# Patient Record
Sex: MALE | Race: ASIAN | Employment: OTHER | URBAN - METROPOLITAN AREA
[De-identification: names, ages, dates, MRNs, and addresses within clinical notes are randomized per-mention and may not be internally consistent; named-entity substitution may affect disease eponyms.]

---

## 2017-04-26 ENCOUNTER — OFFICE VISIT (OUTPATIENT)
Dept: FAMILY MEDICINE CLINIC | Facility: CLINIC | Age: 42
End: 2017-04-26

## 2017-04-26 VITALS
DIASTOLIC BLOOD PRESSURE: 72 MMHG | WEIGHT: 234 LBS | TEMPERATURE: 98 F | OXYGEN SATURATION: 97 % | HEART RATE: 72 BPM | HEIGHT: 74 IN | BODY MASS INDEX: 30.03 KG/M2 | RESPIRATION RATE: 18 BRPM | SYSTOLIC BLOOD PRESSURE: 118 MMHG

## 2017-04-26 DIAGNOSIS — Z00.00 ANNUAL PHYSICAL EXAM: Primary | ICD-10-CM

## 2017-04-26 PROCEDURE — 99386 PREV VISIT NEW AGE 40-64: CPT | Performed by: FAMILY MEDICINE

## 2017-04-26 NOTE — PROGRESS NOTES
/72 mmHg  Pulse 72  Temp(Src) 97.8 °F (36.6 °C) (Oral)  Resp 18  Ht 74\"  Wt 234 lb  BMI 30.03 kg/m2  SpO2 97% Body mass index is 30.03 kg/(m^2). Patient presents with:   Well Adult      Brady Esparza is a 37year old male who presents for a or cough  CARDIOVASCULAR: denies chest pain or JONES; no palpitations  GI: denies nausea, vomiting, constipation, diarrhea; no rectal bleeding; no heartburn  :  (Male):denies nocturia or changes in stream  MUSCULOSKELETAL: no joint complaints upper or lo advised to lose weight,   Exercise regularly --- Dietary measures discussed include diet about 1800 calories - Excercise regimen also reviewed as well as long term benefits on overall health.    Recommend at least 30 minutes a day 3-4 times a week of aerobi

## 2017-04-29 NOTE — PROGRESS NOTES
Quick Note:    Abnormal lipid profile, patient needs to be on medication  Follow-up to discuss options    ______

## 2017-05-01 ENCOUNTER — OFFICE VISIT (OUTPATIENT)
Dept: FAMILY MEDICINE CLINIC | Facility: CLINIC | Age: 42
End: 2017-05-01

## 2017-05-01 VITALS
SYSTOLIC BLOOD PRESSURE: 144 MMHG | DIASTOLIC BLOOD PRESSURE: 92 MMHG | OXYGEN SATURATION: 96 % | BODY MASS INDEX: 30 KG/M2 | WEIGHT: 236 LBS | TEMPERATURE: 98 F | HEART RATE: 71 BPM

## 2017-05-01 DIAGNOSIS — R73.01 IMPAIRED FASTING GLUCOSE: ICD-10-CM

## 2017-05-01 DIAGNOSIS — E78.5 HYPERLIPIDEMIA, UNSPECIFIED HYPERLIPIDEMIA TYPE: Primary | ICD-10-CM

## 2017-05-01 PROCEDURE — 99214 OFFICE O/P EST MOD 30 MIN: CPT | Performed by: FAMILY MEDICINE

## 2017-05-01 RX ORDER — ROSUVASTATIN CALCIUM 10 MG/1
10 TABLET, COATED ORAL NIGHTLY
Qty: 30 TABLET | Refills: 3 | Status: SHIPPED | OUTPATIENT
Start: 2017-05-01 | End: 2018-04-25

## 2017-05-02 NOTE — PROGRESS NOTES
/92 mmHg  Pulse 71  Temp(Src) 98.1 °F (36.7 °C) (Oral)  Wt 236 lb  SpO2 96%              Patient presents with:  Lab Results: room 1--       HPI;    Byron Ragland is a 37year old male. , he was seen here last week, he is here for follow-up after t normocephalic,ears and throat are clear  NECK: supple,no adenopathy,no bruits  LUNGS: clear to auscultation  CARDIO: RRR without murmur  GI: good BS's,no masses, HSM or tenderness  EXTREMITIES: no cyanosis, clubbing or edema    ASSESSMENT AND PLAN:   Diagn

## 2018-02-17 ENCOUNTER — HOSPITAL ENCOUNTER (OUTPATIENT)
Age: 43
Discharge: HOME | End: 2018-02-17
Attending: FAMILY MEDICINE
Payer: COMMERCIAL

## 2018-02-17 VITALS
TEMPERATURE: 98 F | RESPIRATION RATE: 16 BRPM | BODY MASS INDEX: 29.39 KG/M2 | HEIGHT: 74 IN | HEART RATE: 69 BPM | DIASTOLIC BLOOD PRESSURE: 84 MMHG | WEIGHT: 229 LBS | SYSTOLIC BLOOD PRESSURE: 128 MMHG | OXYGEN SATURATION: 97 %

## 2018-02-17 DIAGNOSIS — K12.2 UVULITIS: Primary | ICD-10-CM

## 2018-02-17 LAB — POCT RAPID STREP: NEGATIVE

## 2018-02-17 PROCEDURE — 99204 OFFICE O/P NEW MOD 45 MIN: CPT

## 2018-02-17 PROCEDURE — 87081 CULTURE SCREEN ONLY: CPT | Performed by: FAMILY MEDICINE

## 2018-02-17 PROCEDURE — 99214 OFFICE O/P EST MOD 30 MIN: CPT

## 2018-02-17 PROCEDURE — 87430 STREP A AG IA: CPT | Performed by: FAMILY MEDICINE

## 2018-02-17 RX ORDER — METHYLPREDNISOLONE 4 MG/1
TABLET ORAL
Qty: 1 PACKAGE | Refills: 0 | Status: SHIPPED | OUTPATIENT
Start: 2018-02-17 | End: 2018-04-25 | Stop reason: ALTCHOICE

## 2018-02-17 RX ORDER — AMOXICILLIN AND CLAVULANATE POTASSIUM 875; 125 MG/1; MG/1
1 TABLET, FILM COATED ORAL 2 TIMES DAILY
Qty: 20 TABLET | Refills: 0 | Status: SHIPPED | OUTPATIENT
Start: 2018-02-17 | End: 2018-02-27

## 2018-02-17 NOTE — ED PROVIDER NOTES
Patient Seen in: [facility]    History   Patient presents with:  Sore Throat    Stated Complaint: Crow EVANGELISTA  45-year-old male coming in with complains of swollen uvula that he woke up this morning with, associate obstruction, bilateral tympanic membranes within normal limits, nares normal  NECK: FROM, supple, anterior cervical lymphadenopathy noted bilaterally  LUNGS: CTAB, no RRW  CV: RRR  ABD: not distended  NEURO: Alert and oriented to person place and time  [facility] Normal, Disp-1 Package, [employer]

## 2018-02-17 NOTE — ED INITIAL ASSESSMENT (HPI)
The patient is here for evaluation of a swollen uvula and throat discomfort. Denies any SOB, BAKARI, or dysphagia. States he woke up with the swollen uvula this morning and hasn't taken anything for the discomfort.   Denies any fevers, chills, or other sympt

## 2018-04-12 ENCOUNTER — LAB SERVICES (OUTPATIENT)
Dept: OTHER | Age: 43
End: 2018-04-12

## 2018-04-12 ENCOUNTER — CHARTING TRANS (OUTPATIENT)
Dept: OTHER | Age: 43
End: 2018-04-12

## 2018-04-12 LAB — RAPID STREP GROUP A: NORMAL

## 2018-04-25 ENCOUNTER — OFFICE VISIT (OUTPATIENT)
Dept: FAMILY MEDICINE CLINIC | Facility: CLINIC | Age: 43
End: 2018-04-25

## 2018-04-25 VITALS
HEIGHT: 74 IN | HEART RATE: 67 BPM | DIASTOLIC BLOOD PRESSURE: 84 MMHG | RESPIRATION RATE: 18 BRPM | BODY MASS INDEX: 29.9 KG/M2 | OXYGEN SATURATION: 98 % | SYSTOLIC BLOOD PRESSURE: 120 MMHG | TEMPERATURE: 98 F | WEIGHT: 233 LBS

## 2018-04-25 DIAGNOSIS — R03.0 ELEVATED BLOOD PRESSURE READING WITHOUT DIAGNOSIS OF HYPERTENSION: ICD-10-CM

## 2018-04-25 DIAGNOSIS — Z00.00 ROUTINE GENERAL MEDICAL EXAMINATION AT A HEALTH CARE FACILITY: Primary | ICD-10-CM

## 2018-04-25 DIAGNOSIS — E78.5 HYPERLIPIDEMIA, UNSPECIFIED HYPERLIPIDEMIA TYPE: ICD-10-CM

## 2018-04-25 DIAGNOSIS — R73.01 IMPAIRED FASTING GLUCOSE: ICD-10-CM

## 2018-04-25 PROCEDURE — 85027 COMPLETE CBC AUTOMATED: CPT | Performed by: FAMILY MEDICINE

## 2018-04-25 PROCEDURE — 80053 COMPREHEN METABOLIC PANEL: CPT | Performed by: FAMILY MEDICINE

## 2018-04-25 PROCEDURE — 80061 LIPID PANEL: CPT | Performed by: FAMILY MEDICINE

## 2018-04-25 PROCEDURE — 83036 HEMOGLOBIN GLYCOSYLATED A1C: CPT | Performed by: FAMILY MEDICINE

## 2018-04-25 PROCEDURE — 99213 OFFICE O/P EST LOW 20 MIN: CPT | Performed by: FAMILY MEDICINE

## 2018-04-25 PROCEDURE — 84443 ASSAY THYROID STIM HORMONE: CPT | Performed by: FAMILY MEDICINE

## 2018-04-25 PROCEDURE — 99396 PREV VISIT EST AGE 40-64: CPT | Performed by: FAMILY MEDICINE

## 2018-04-25 PROCEDURE — 84439 ASSAY OF FREE THYROXINE: CPT | Performed by: FAMILY MEDICINE

## 2018-04-25 RX ORDER — ROSUVASTATIN CALCIUM 10 MG/1
10 TABLET, COATED ORAL NIGHTLY
Qty: 30 TABLET | Refills: 3 | Status: SHIPPED | OUTPATIENT
Start: 2018-04-25 | End: 2019-02-18

## 2018-04-25 NOTE — PROGRESS NOTES
/84   Pulse 67   Temp 98.1 °F (36.7 °C) (Oral)   Resp 18   Ht 74\"   Wt 233 lb   SpO2 98%   BMI 29.92 kg/m²  Body mass index is 29.92 kg/m². Patient presents with:   Well Adult      Helen Dill is a 40year old male who presents for a compl Topics    Smoking status: Never Smoker                                                                Smokeless tobacco: Never Used                        Alcohol use: No              Drug use:  No               Exercise: Regularly diet: doesn't watch     R and all orders for this visit:    Routine general medical examination at a health care facility  -     CBC, PLATELET; NO DIFFERENTIAL;  Future  -     COMP METABOLIC PANEL (14)  -     HEMOGLOBIN A1C  -     LIPID PANEL  -     TSH+FREE T4; Future  -     PSA SC plan.  . No Follow-up on file.         Erick Muñoz MD, 4/25/2018, 9:45 AM

## 2018-04-30 ENCOUNTER — TELEPHONE (OUTPATIENT)
Dept: FAMILY MEDICINE CLINIC | Facility: CLINIC | Age: 43
End: 2018-04-30

## 2018-07-30 ENCOUNTER — TELEPHONE (OUTPATIENT)
Dept: FAMILY MEDICINE CLINIC | Facility: CLINIC | Age: 43
End: 2018-07-30

## 2018-07-30 PROBLEM — R79.89 DECREASED TESTOSTERONE LEVEL: Status: ACTIVE | Noted: 2018-07-30

## 2018-09-04 ENCOUNTER — TELEPHONE (OUTPATIENT)
Dept: FAMILY MEDICINE CLINIC | Facility: CLINIC | Age: 43
End: 2018-09-04

## 2018-09-04 NOTE — TELEPHONE ENCOUNTER
[facility] paperwork is complete just needs Dr. Bernadette Bethea. Then the paper work needs to be faxed to [phone].      Kristal Joseph, 09/04/18, 10:51 AM

## 2018-10-31 NOTE — PROGRESS NOTES
/70   Pulse 65   Temp 98.1 °F (36.7 °C) (Oral)   Resp 18   Ht 74\"   Wt 230 lb   SpO2 98%   BMI 29.53 kg/m²               Patient presents with:  ADD       HPI;    Gwendolyn Fuentes is a 44year old male, who is otherwise healthy  He is taking his med heartburn  NEURO: denies headaches  EXAM:   /70   Pulse 65   Temp 98.1 °F (36.7 °C) (Oral)   Resp 18   Ht 74\"   Wt 230 lb   SpO2 98%   BMI 29.53 kg/m²   GENERAL: well developed, well nourished,in no apparent distress  SKIN: no rashes,no suspicious l TSH and Free T4            Sandra Matthew [address] [place] [address]., [address] [place] [address]    Electronically signed        Influenza Vaccine(1) due on 09/01/2019  Annual Depression Screen due on 04/25/2019  Annual Physical due on 04/

## 2018-11-01 VITALS
DIASTOLIC BLOOD PRESSURE: 72 MMHG | SYSTOLIC BLOOD PRESSURE: 116 MMHG | RESPIRATION RATE: 16 BRPM | TEMPERATURE: 97.9 F | HEART RATE: 68 BPM

## 2019-02-19 RX ORDER — ROSUVASTATIN CALCIUM 10 MG/1
10 TABLET, COATED ORAL NIGHTLY
Qty: 30 TABLET | Refills: 3 | Status: SHIPPED | OUTPATIENT
Start: 2019-02-19 | End: 2020-02-26

## 2019-02-19 NOTE — TELEPHONE ENCOUNTER
Rosuvastatin Calcium 10 MG Oral Tab          Sig: Take 1 tablet (10 mg total) by mouth nightly.     Disp:  30 tablet    Refills:  3    Start: 2/18/2019    Class: Normal    Non-formulary    Last ordered: 10 months ago by Alyse Ness MD     Cholesterol Medic

## 2020-02-26 ENCOUNTER — OFFICE VISIT (OUTPATIENT)
Dept: FAMILY MEDICINE CLINIC | Facility: CLINIC | Age: 45
End: 2020-02-26
Payer: COMMERCIAL

## 2020-02-26 VITALS
HEART RATE: 66 BPM | TEMPERATURE: 99 F | OXYGEN SATURATION: 97 % | DIASTOLIC BLOOD PRESSURE: 68 MMHG | RESPIRATION RATE: 16 BRPM | HEIGHT: 74 IN | SYSTOLIC BLOOD PRESSURE: 106 MMHG | BODY MASS INDEX: 29.73 KG/M2 | WEIGHT: 231.63 LBS

## 2020-02-26 DIAGNOSIS — Z00.00 ENCOUNTER FOR ANNUAL PHYSICAL EXAM: Primary | ICD-10-CM

## 2020-02-26 DIAGNOSIS — E78.5 HYPERLIPIDEMIA, UNSPECIFIED HYPERLIPIDEMIA TYPE: ICD-10-CM

## 2020-02-26 PROCEDURE — 99396 PREV VISIT EST AGE 40-64: CPT | Performed by: FAMILY MEDICINE

## 2020-02-26 RX ORDER — ROSUVASTATIN CALCIUM 10 MG/1
10 TABLET, COATED ORAL NIGHTLY
Qty: 30 TABLET | Refills: 3 | Status: SHIPPED | OUTPATIENT
Start: 2020-02-26 | End: 2020-12-31

## 2020-02-26 NOTE — PROGRESS NOTES
/68   Pulse 66   Temp 98.6 °F (37 °C) (Temporal)   Resp 16   Ht 74\"   Wt 231 lb 9.6 oz (105.1 kg)   SpO2 97%   BMI 29.74 kg/m²  Body mass index is 29.74 kg/m².      Patient presents with:  Physical  Complete Form: for [facility]   Lab: needed Never Smoker      Smokeless tobacco: Never Used    Substance and Sexual Activity      Alcohol use: No      Drug use: No     Exercise: none.   Diet: doesn't watch     REVIEW OF SYSTEMS:   GENERAL HEALTH: feels well otherwise, denies fever  SKIN: denies any u annual physical exam  -Fasting blood work ordered  Flu shot refused  -     CBC WITH DIFFERENTIAL WITH PLATELET  -     COMP METABOLIC PANEL (14)  -     LIPID PANEL  -     aspirin 81 MG Oral Tab;  Take 1 tablet (81 mg total) by mouth daily.  -     TSH [facility]

## 2020-02-27 LAB
ABSOLUTE BASOPHILS: 41 CELLS/UL (ref 0–200)
ABSOLUTE EOSINOPHILS: 102 CELLS/UL (ref 15–500)
ABSOLUTE LYMPHOCYTES: 2086 CELLS/UL (ref 850–3900)
ABSOLUTE MONOCYTES: 342 CELLS/UL (ref 200–950)
ABSOLUTE NEUTROPHILS: 2530 CELLS/UL (ref 1500–7800)
ALBUMIN/GLOBULIN RATIO: 1.7 (CALC) (ref 1–2.5)
ALBUMIN: 4.7 G/DL (ref 3.6–5.1)
ALKALINE PHOSPHATASE: 71 U/L (ref 36–130)
ALT: 24 U/L (ref 9–46)
AST: 16 U/L (ref 10–40)
BASOPHILS: 0.8 %
BILIRUBIN, TOTAL: 0.6 MG/DL (ref 0.2–1.2)
BUN: 13 MG/DL (ref 7–25)
CALCIUM: 9.6 MG/DL (ref 8.6–10.3)
CARBON DIOXIDE: 29 MMOL/L (ref 20–32)
CHLORIDE: 105 MMOL/L (ref 98–110)
CHOL/HDLC RATIO: 5.8 (CALC)
CHOLESTEROL, TOTAL: 204 MG/DL
CREATININE: 0.9 MG/DL (ref 0.6–1.35)
EGFR IF AFRICN AM: 121 ML/MIN/1.73M2
EGFR IF NONAFRICN AM: 104 ML/MIN/1.73M2
EOSINOPHILS: 2 %
GLOBULIN: 2.8 G/DL (CALC) (ref 1.9–3.7)
GLUCOSE: 101 MG/DL (ref 65–99)
HDL CHOLESTEROL: 35 MG/DL
HEMATOCRIT: 44.5 % (ref 38.5–50)
HEMOGLOBIN A1C: 6.1 % OF TOTAL HGB
HEMOGLOBIN: 15.4 G/DL (ref 13.2–17.1)
LDL-CHOLESTEROL: 124 MG/DL (CALC)
LYMPHOCYTES: 40.9 %
MCH: 30.3 PG (ref 27–33)
MCHC: 34.6 G/DL (ref 32–36)
MCV: 87.6 FL (ref 80–100)
MONOCYTES: 6.7 %
MPV: 10.1 FL (ref 7.5–12.5)
NEUTROPHILS: 49.6 %
NON-HDL CHOLESTEROL: 169 MG/DL (CALC)
PLATELET COUNT: 252 THOUSAND/UL (ref 140–400)
POTASSIUM: 4.6 MMOL/L (ref 3.5–5.3)
PROTEIN, TOTAL: 7.5 G/DL (ref 6.1–8.1)
RDW: 12.3 % (ref 11–15)
RED BLOOD CELL COUNT: 5.08 MILLION/UL (ref 4.2–5.8)
SODIUM: 141 MMOL/L (ref 135–146)
TRIGLYCERIDES: 317 MG/DL
TSH W/REFLEX TO FT4: 2.4 MIU/L (ref 0.4–4.5)
WHITE BLOOD CELL COUNT: 5.1 THOUSAND/UL (ref 3.8–10.8)

## 2020-02-27 NOTE — PROGRESS NOTES
Paper work for [facility] is at the . [address] Dr. Marlaine Olszewski, 02/26/20, 11:12 AM

## 2020-02-27 NOTE — PROGRESS NOTES
Elevated cholesterol and LDL as well as triglycerides, need to take the medication Crestor regularly  Recheck lipid profile and CMP in 3 months  Your sugars were also borderline high  Your hemoglobin A1c has increased to 6.1  Need to exercise and lose weig

## 2020-02-28 DIAGNOSIS — R73.09 ABNORMAL GLUCOSE: ICD-10-CM

## 2020-02-28 DIAGNOSIS — E78.5 HYPERLIPIDEMIA, UNSPECIFIED HYPERLIPIDEMIA TYPE: Primary | ICD-10-CM

## 2020-03-04 ENCOUNTER — TELEPHONE (OUTPATIENT)
Dept: FAMILY MEDICINE CLINIC | Facility: CLINIC | Age: 45
End: 2020-03-04

## 2020-05-04 DIAGNOSIS — E78.5 HYPERLIPIDEMIA, UNSPECIFIED HYPERLIPIDEMIA TYPE: ICD-10-CM

## 2020-05-04 DIAGNOSIS — Z00.00 ENCOUNTER FOR ANNUAL PHYSICAL EXAM: ICD-10-CM

## 2020-05-05 RX ORDER — ROSUVASTATIN CALCIUM 10 MG/1
10 TABLET, COATED ORAL NIGHTLY
Qty: 30 TABLET | Refills: 3 | OUTPATIENT
Start: 2020-05-05

## 2020-05-05 NOTE — TELEPHONE ENCOUNTER
LOV 2/26/2020    LAST LAB 2-26-20    LAST RX 2-26-20 30*3    Next OV No future appointments. PROTOCOL    Rosuvastatin Calcium 10 MG Oral Tab               Sig: Take 1 tablet (10 mg total) by mouth nightly.     Disp:  30 tablet    Refills:  3    Start: 5/

## 2020-12-31 DIAGNOSIS — E78.5 HYPERLIPIDEMIA, UNSPECIFIED HYPERLIPIDEMIA TYPE: ICD-10-CM

## 2020-12-31 DIAGNOSIS — Z00.00 ENCOUNTER FOR ANNUAL PHYSICAL EXAM: ICD-10-CM

## 2020-12-31 RX ORDER — ROSUVASTATIN CALCIUM 10 MG/1
10 TABLET, COATED ORAL NIGHTLY
Qty: 90 TABLET | Refills: 1 | Status: SHIPPED | OUTPATIENT
Start: 2020-12-31 | End: 2021-07-01

## 2020-12-31 NOTE — TELEPHONE ENCOUNTER
LOV 2/26/2020    LAST LAB 2/26/2020    LAST RX   Rosuvastatin Calcium 10 MG Oral Tab 30 tablet 3 2/26/2020         Next OV No future appointments. PROTOCOL passed.

## 2020-12-31 NOTE — TELEPHONE ENCOUNTER
LOV 2/26/2020    LAST LAB    LAST RX   aspirin 81 MG Oral Tab 30 tablet 12 5/5/2020         Next OV No future appointments.       PROTOCOL n/a

## 2021-03-09 DIAGNOSIS — Z00.00 ENCOUNTER FOR ANNUAL PHYSICAL EXAM: ICD-10-CM

## 2021-03-09 DIAGNOSIS — E78.5 HYPERLIPIDEMIA, UNSPECIFIED HYPERLIPIDEMIA TYPE: ICD-10-CM

## 2021-03-09 RX ORDER — ROSUVASTATIN CALCIUM 10 MG/1
10 TABLET, COATED ORAL NIGHTLY
Qty: 90 TABLET | Refills: 1 | Status: CANCELLED | OUTPATIENT
Start: 2021-03-09

## 2021-03-09 RX ORDER — ROSUVASTATIN CALCIUM 10 MG/1
10 TABLET, COATED ORAL NIGHTLY
Qty: 90 TABLET | Refills: 1 | OUTPATIENT
Start: 2021-03-09

## 2021-03-09 NOTE — TELEPHONE ENCOUNTER
LOV 2/26/2020    LAST LAB    LAST RX    Next OV No future appointments.       PROTOCOL failed  Rosuvastatin Calcium 10 MG Oral Tab 90 tablet 1 12/31/2020     DENIED AS DUPLICATE, INSTRUCTIONS TO PHARMACY TO CHECK FOR NEW/ REFILLS

## 2021-03-09 NOTE — TELEPHONE ENCOUNTER
Cholesterol Medication Protocol [id number]/09/2021 04:22 PM   ALT < 80 Protocol Details    ALT resulted within past year     Lipid panel within past 12 months     Appointment within past 12 or next 3 months      Please schedule annual physical . Thank you .

## 2021-03-10 NOTE — TELEPHONE ENCOUNTER
aspirin 81 MG Oral Tab 30 tablet 12 12/31/2020     DENIED AS DUPLICATE, INSTRUCTIONS TO PHARMACY TO CHECK FOR NEW/ REFILLS

## 2021-06-28 ENCOUNTER — MED REC SCAN ONLY (OUTPATIENT)
Dept: FAMILY MEDICINE CLINIC | Facility: CLINIC | Age: 46
End: 2021-06-28

## 2021-06-28 ENCOUNTER — TELEPHONE (OUTPATIENT)
Dept: FAMILY MEDICINE CLINIC | Facility: CLINIC | Age: 46
End: 2021-06-28

## 2021-06-28 ENCOUNTER — OFFICE VISIT (OUTPATIENT)
Dept: FAMILY MEDICINE CLINIC | Facility: CLINIC | Age: 46
End: 2021-06-28
Payer: COMMERCIAL

## 2021-06-28 ENCOUNTER — LAB ENCOUNTER (OUTPATIENT)
Dept: LAB | Age: 46
End: 2021-06-28
Attending: FAMILY MEDICINE
Payer: COMMERCIAL

## 2021-06-28 VITALS
TEMPERATURE: 97 F | HEART RATE: 80 BPM | SYSTOLIC BLOOD PRESSURE: 126 MMHG | RESPIRATION RATE: 18 BRPM | DIASTOLIC BLOOD PRESSURE: 80 MMHG | BODY MASS INDEX: 31.7 KG/M2 | WEIGHT: 247 LBS | HEIGHT: 74 IN | OXYGEN SATURATION: 97 %

## 2021-06-28 DIAGNOSIS — S46.312A TRICEPS TENDON RUPTURE, LEFT, INITIAL ENCOUNTER: ICD-10-CM

## 2021-06-28 DIAGNOSIS — Z01.818 PREOP EXAMINATION: Primary | ICD-10-CM

## 2021-06-28 PROCEDURE — 85025 COMPLETE CBC W/AUTO DIFF WBC: CPT | Performed by: FAMILY MEDICINE

## 2021-06-28 PROCEDURE — 3008F BODY MASS INDEX DOCD: CPT | Performed by: FAMILY MEDICINE

## 2021-06-28 PROCEDURE — 99214 OFFICE O/P EST MOD 30 MIN: CPT | Performed by: FAMILY MEDICINE

## 2021-06-28 PROCEDURE — 36415 COLL VENOUS BLD VENIPUNCTURE: CPT | Performed by: FAMILY MEDICINE

## 2021-06-28 PROCEDURE — 3074F SYST BP LT 130 MM HG: CPT | Performed by: FAMILY MEDICINE

## 2021-06-28 PROCEDURE — 3079F DIAST BP 80-89 MM HG: CPT | Performed by: FAMILY MEDICINE

## 2021-06-28 PROCEDURE — 93000 ELECTROCARDIOGRAM COMPLETE: CPT | Performed by: FAMILY MEDICINE

## 2021-06-28 PROCEDURE — 80053 COMPREHEN METABOLIC PANEL: CPT | Performed by: FAMILY MEDICINE

## 2021-06-28 NOTE — TELEPHONE ENCOUNTER
[facility] surgery scheduling called again.     Please fax signed medical clearance to :    [phone]

## 2021-06-28 NOTE — PROGRESS NOTES
Melissa Patel is a 40year old male who presents for a pre-operative physical exam.   Melissa Patel is scheduled for a Triceps repair procedure at [facility] on 6/29/21performed by Dr Lincoln Day.  Indication: triceps rupture     HPI related to sachin Wt 247 lb (112 kg)   SpO2 97%   BMI 31.71 kg/m²      GENERAL: well developed, well nourished, in no apparent distress  SKIN: no rashes, no suspicious lesions  HEENT: atraumatic, normocephalic, ears and throat are clear  EYES: PERRLA, EOMI, conjunctiva are

## 2021-06-28 NOTE — TELEPHONE ENCOUNTER
URGENT   please fax signed EKG with Tracing and H&P done today to [address] [place] [address]  Fax #[phone]  for procedure tomorrow. Faxed requested given to nurse.

## 2021-07-01 DIAGNOSIS — E78.5 HYPERLIPIDEMIA, UNSPECIFIED HYPERLIPIDEMIA TYPE: ICD-10-CM

## 2021-07-01 DIAGNOSIS — Z00.00 ENCOUNTER FOR ANNUAL PHYSICAL EXAM: ICD-10-CM

## 2021-07-01 RX ORDER — ROSUVASTATIN CALCIUM 10 MG/1
10 TABLET, COATED ORAL NIGHTLY
Qty: 90 TABLET | Refills: 1 | Status: SHIPPED | OUTPATIENT
Start: 2021-07-01 | End: 2022-01-03

## 2021-07-01 NOTE — TELEPHONE ENCOUNTER
Cholesterol Medication Protocol [id number]/01/2021 11:04 AM   Lipid panel within past 12 months Protocol Details    ALT < 80     ALT resulted within past year     Appointment within past 12 or next 3 months      LOV 6/28/21     LAST LAB   2/26/20     LAST RX

## 2021-07-01 NOTE — TELEPHONE ENCOUNTER
aspirin 81 MG Oral Tab         Sig: Take 1 tablet (81 mg total) by mouth daily.     Disp:  30 tablet    Refills:  12    Start: 7/1/2021    Class: Normal    Non-formulary      LOV  6/28/21     LAST LAB n/a     LAST RX  12/31/20 30 with 12 refills     Next O

## 2022-01-03 DIAGNOSIS — Z00.00 ENCOUNTER FOR ANNUAL PHYSICAL EXAM: ICD-10-CM

## 2022-01-03 DIAGNOSIS — E78.5 HYPERLIPIDEMIA, UNSPECIFIED HYPERLIPIDEMIA TYPE: ICD-10-CM

## 2022-01-03 RX ORDER — ROSUVASTATIN CALCIUM 10 MG/1
10 TABLET, COATED ORAL NIGHTLY
Qty: 90 TABLET | Refills: 1 | Status: SHIPPED | OUTPATIENT
Start: 2022-01-03

## 2022-01-03 NOTE — TELEPHONE ENCOUNTER
LOV 2/26/2020    LAST LAB 2/26/2020    LAST RX   Rosuvastatin Calcium 10 MG Oral Tab 90 tablet 1 7/1/2021     aspirin 81 MG Oral Tab 30 tablet 12 12/31/2020           Next OV No future appointments.       PROTOCOL failed

## 2023-04-14 ENCOUNTER — TELEPHONE (OUTPATIENT)
Dept: FAMILY MEDICINE CLINIC | Facility: CLINIC | Age: 48
End: 2023-04-14

## 2023-04-17 ENCOUNTER — PATIENT OUTREACH (OUTPATIENT)
Dept: CASE MANAGEMENT | Age: 48
End: 2023-04-17

## 2023-04-17 NOTE — PROCEDURES
The office order for PCP removal request is Denied and finalized on April 17, 2023. Denied - Office to Verify PCP Status: LOV on 06/28/2021 with Dr. Zeus Leon   1. Office must call patient to verify PCP status and request to schedule an appointment with provider. 2. If no response, Office should also send a letter to patient via mail and My-Chart requesting to schedule office visit with provider.       Thanks,  [facility] [employer]

## 2023-06-14 ENCOUNTER — TELEPHONE (OUTPATIENT)
Dept: FAMILY MEDICINE CLINIC | Facility: CLINIC | Age: 48
End: 2023-06-14

## 2024-08-02 ENCOUNTER — PATIENT MESSAGE (OUTPATIENT)
Dept: FAMILY MEDICINE CLINIC | Facility: CLINIC | Age: 49
End: 2024-08-02

## 2024-08-07 ENCOUNTER — TELEPHONE (OUTPATIENT)
Dept: FAMILY MEDICINE CLINIC | Facility: CLINIC | Age: 49
End: 2024-08-07

## 2024-08-07 ENCOUNTER — PATIENT OUTREACH (OUTPATIENT)
Dept: CASE MANAGEMENT | Age: 49
End: 2024-08-07

## 2024-08-07 NOTE — PROCEDURES
The office order for PCP removal request is Approved and finalized on August 7, 2024.    Removed Salvatore Trujillo MD as the patient's Primary Care Physician

## (undated) NOTE — MR AVS SNAPSHOT
[facility]  [address] [facility] [place], [address] [address]               Thank you for choosing us for your health care visit with Gaurav Gill MD.  We are glad to serve you and happy to cristal discharge instructions in [employer] by going to Visits < Admission Summaries. If you've been to the Emergency Department or your doctor's office, you can view your past visit information in [employer] by going to Visits < Visit Summaries. [employer] questions?

## (undated) NOTE — LETTER
June 28, 2021    [facility]  [address] [facility] [facility] [address] [facility]      Dear Micheal Uribe: The following are the results of your recent tests. Please review the list of test results.   Your result is the value on the left; we have also supplied the range

## (undated) NOTE — Clinical Note
05/31/2017        [place] [id number]      Dear Santi Moncada,    Our records indicate that you have outstanding lab work and or testing that was ordered for you and has not yet been completed:    Comp Metabolic Panel  Lipid Pan

## (undated) NOTE — Clinical Note
May 1, 2017    Helen Band  [address] [facility]      Dear Thelma Bach: The following are the results of your recent tests. Please review the list of test results.   Your result is the value on the left; we have also supplied the range of ABSOLUTE MONOCYTES 325 200 - 950 cells/uL   ABSOLUTE EOSINOPHILS 90 15 - 500 cells/uL   ABSOLUTE BASOPHILS 28 0 - 200 cells/uL   NEUTROPHILS 52.4 %   LYMPHOCYTES 39.7 %   MONOCYTES 5.8 %   EOSINOPHILS 1.6 %   BASOPHILS 0.5 %   -TSH W REFLEX TO FREE T4   Re

## (undated) NOTE — MR AVS SNAPSHOT
[facility] Utilities  [address] [facility] [place], [address] [phone]               Thank you for choosing us for your health care visit with Gilma Kitchen MD.  We are glad to serve you and happy to p Visit [place]  You can access your MyChart to more actively manage your health care and view more details from this visit by going to [email or web address].   If you've recently had a stay at the Hospital you can access your discharge instructions i